# Patient Record
Sex: FEMALE
[De-identification: names, ages, dates, MRNs, and addresses within clinical notes are randomized per-mention and may not be internally consistent; named-entity substitution may affect disease eponyms.]

---

## 2018-07-25 ENCOUNTER — HOSPITAL ENCOUNTER (EMERGENCY)
Dept: HOSPITAL 42 - ED | Age: 23
LOS: 1 days | Discharge: HOME | End: 2018-07-26
Payer: SELF-PAY

## 2018-07-25 VITALS — SYSTOLIC BLOOD PRESSURE: 100 MMHG | DIASTOLIC BLOOD PRESSURE: 52 MMHG

## 2018-07-25 VITALS — BODY MASS INDEX: 23.8 KG/M2

## 2018-07-25 DIAGNOSIS — R10.9: ICD-10-CM

## 2018-07-25 DIAGNOSIS — O26.891: Primary | ICD-10-CM

## 2018-07-25 DIAGNOSIS — Z3A.13: ICD-10-CM

## 2018-07-25 DIAGNOSIS — J02.9: ICD-10-CM

## 2018-07-25 LAB
ALBUMIN SERPL-MCNC: 4.1 G/DL (ref 3–4.8)
ALBUMIN/GLOB SERPL: 1.2 {RATIO} (ref 1.1–1.8)
ALT SERPL-CCNC: 18 U/L (ref 7–56)
APPEARANCE UR: CLEAR
AST SERPL-CCNC: 19 U/L (ref 14–36)
BACTERIA #/AREA URNS HPF: (no result) /[HPF]
BILIRUB UR-MCNC: NEGATIVE MG/DL
BUN SERPL-MCNC: 3 MG/DL (ref 7–21)
CALCIUM SERPL-MCNC: 9.4 MG/DL (ref 8.4–10.5)
COLOR UR: (no result)
ERYTHROCYTE [DISTWIDTH] IN BLOOD BY AUTOMATED COUNT: 13.8 % (ref 11.5–14.5)
GFR NON-AFRICAN AMERICAN: > 60
GLUCOSE UR STRIP-MCNC: NEGATIVE MG/DL
HGB BLD-MCNC: 11.8 G/DL (ref 12–16)
LEUKOCYTE ESTERASE UR-ACNC: NEGATIVE LEU/UL
LIPASE SERPL-CCNC: 31 U/L (ref 23–300)
MCH RBC QN AUTO: 25.7 PG (ref 25–35)
MCHC RBC AUTO-ENTMCNC: 33.7 G/DL (ref 31–37)
MCV RBC AUTO: 76.3 FL (ref 80–105)
PH UR STRIP: 8 [PH] (ref 4.7–8)
PLATELET # BLD: 183 10^3/UL (ref 120–450)
PMV BLD AUTO: 10.3 FL (ref 7–11)
PROT UR STRIP-MCNC: 30 MG/DL
RBC # BLD AUTO: 4.59 10^6/UL (ref 3.5–6.1)
RBC # UR STRIP: NEGATIVE /UL
RBC #/AREA URNS HPF: (no result) /HPF (ref 0–2)
SP GR UR STRIP: 1.02 (ref 1–1.03)
UROBILINOGEN UR STRIP-ACNC: 0.2 E.U./DL
WBC # BLD AUTO: 10.1 10^3/UL (ref 4.5–11)
WBC #/AREA URNS HPF: NEGATIVE /HPF (ref 0–6)

## 2018-07-25 NOTE — ED PDOC
Arrival/HPI





- General


Time Seen by Provider: 18 20:37


Historian: Patient





- History of Present Illness


Narrative History of Present Illness (Text): 


18 20:53


Lauren Villegas is a 22 year old female,  P:1, currently 3 

months pregnant, whose past medical history includes appendectomy and 

nephrolithiasis, who presents to the emergency department complaining of 

abdominal cramping with associated lower back discomfort for the past few days. 

Patient also complaining of sore throat and left ear pain. Patient denies any 

fever, chills, chest pain, shortness of breath, nausea, vomiting, diarrhea, 

urinary symptoms, neck pain, headache, dizziness, or any other complaints.





Symptom Onset: Gradual


Symptom Course: Unchanged


Activities at Onset: Light


Context: Home





Past Medical History





- Provider Review


Nursing Documentation Reviewed: Yes





Family/Social History





- Physician Review


Nursing Documentation Reviewed: Yes


Family/Social History: Unknown Family HX





Allergies/Home Meds


Allergies/Adverse Reactions: 


Allergies





No Known Allergies Allergy (Verified 18 20:47)


 











Review of Systems





- Physician Review


All systems were reviewed & negative as marked: Yes





- Review of Systems


Constitutional: Normal.  absent: Fevers


Eyes: Normal


ENT: Sore Throat, Other (+left ear pain)


Respiratory: Normal.  absent: SOB, Cough, Wheezing


Cardiovascular: Normal.  absent: Chest Pain


Gastrointestinal: Abdominal Pain (+suprapubic).  absent: Diarrhea, Nausea, 

Vomiting


Genitourinary Female: Normal.  absent: Dysuria, Frequency, Hematuria, Urine 

Output Changes


Musculoskeletal: Back Pain.  absent: Neck Pain


Skin: Normal.  absent: Rash


Neurological: Normal.  absent: Headache, Dizziness


Endocrine: Normal


Hemo/Lymphatic: Normal


Psychiatric: Normal





Physical Exam


Vital Signs Reviewed: Yes


Vital Signs











  Temp Pulse Resp BP Pulse Ox


 


 18 22:31  99.7 F H  128 H  20  100/52 L  100


 


 18 20:30  98 F  130 H  18  114/58 L  99











Temperature: Afebrile


Blood Pressure: Normal


Pulse: Regular


Respiratory Rate: Normal


Appearance: Positive for: Well-Appearing, Non-Toxic, Comfortable


Pain Distress: None


Mental Status: Positive for: Alert and Oriented X 3





- Systems Exam


Head: Present: Atraumatic, Normocephalic


Pupils: Present: PERRL


Extroacular Muscles: Present: EOMI


Conjunctiva: Present: Normal


Ears: Present: Normal, NORMAL TM, Normal Canal.  No: Erythema, TM Bulging, Fluid

, TM Perf


Mouth: Present: Moist Mucous Membranes


Pharnyx: Present: ERYTHEMA (Erythema to posterior pharynx).  No: EXUDATE, 

TONSILS ENLARGED, Peritonsilar Swelling, Uvular Deviation, Muffled/Hoarse Voice

, Strider, Soft Palate/Uvular Edema


Nose (External): Present: Atraumatic


Nose (Internal): Present: Normal Inspection


Neck: Present: Normal Range of Motion


Respiratory/Chest: Present: Clear to Auscultation, Good Air Exchange.  No: 

Respiratory Distress, Accessory Muscle Use


Cardiovascular: Present: Regular Rate and Rhythm, Normal S1, S2.  No: Murmurs


Abdomen: No: Tenderness, Distention, Peritoneal Signs


Back: Present: Normal Inspection


Upper Extremity: Present: Normal Inspection.  No: Cyanosis, Edema


Lower Extremity: Present: Normal Inspection.  No: Edema


Neurological: Present: GCS=15, CN II-XII Intact, Speech Normal


Skin: Present: Warm, Dry, Normal Color.  No: Rashes


Psychiatric: Present: Alert, Oriented x 3, Normal Insight, Normal Concentration





Medical Decision Making


ED Course and Treatment: 


18 20:53


Impression:


22 year old female complaining of abdominal cramping, lower back discomfort, 

sore throat, and left ear pain.





Plan:


-- US Fetal Age


-- Labs, Beta-HCG, lipase


-- UA


-- Reassess and disposition





Progress Notes:


18 22:57


US Fetal Age shows:


Fetus: There is a single living intrauterine pregnancy.


Gestational age: Measurements correlate with a mean gestational age of 13 weeks 

4 days. The


estimated date of delivery 2019.


Heart rate: Embryonic/fetal cardiac activity is identified, at a rate of 172 

beats per minute.


Placenta: The placenta is located posteriorly and is normal.


The cervix is closed measuring 4 cm.


IMPRESSION:


Live intrauterine pregnancy, with no evidence of early pregnancy complications.





18 00:05


On re-evaluation, pt feels better, in no acute distress. I have discussed the 

plan with the patient, who expresses understanding. Patient in agreement with 

the plan to be discharged home. Patient is stable for discharge. Patient was 

instructed to follow up with physician or return if symptoms persist/worsen or 

new concerning symptoms arise.








- Lab Interpretations


Lab Results: 








 18 21:30 





 18 21:30 





 Lab Results





18 21:30: WBC 10.1, RBC 4.59, Hgb 11.8 L, Hct 35.0 L, MCV 76.3 L, MCH 25.7

, MCHC 33.7, RDW 13.8, Plt Count 183, MPV 10.3


18 21:30: Beta HCG, Quant 427269.00 H


18 21:30: Sodium 136, Potassium 4.1, Chloride 103, Carbon Dioxide 21, 

Anion Gap 16, BUN 3 L, Creatinine 0.5 L, Est GFR ( Amer) > 60, Est GFR (

Non-Af Amer) > 60, Random Glucose 95, Calcium 9.4, Total Bilirubin 0.3, AST 19, 

ALT 18, Alkaline Phosphatase 59, Total Protein 7.6, Albumin 4.1, Globulin 3.5, 

Albumin/Globulin Ratio 1.2, Lipase 31


18 21:30: Urine Color Dark yellow, Urine Appearance Clear, Urine pH 8.0, 

Ur Specific Gravity 1.020, Urine Protein 30 H, Urine Glucose (UA) Negative, 

Urine Ketones Negative, Urine Blood Negative, Urine Nitrate Negative, Urine 

Bilirubin Negative, Urine Urobilinogen 0.2, Ur Leukocyte Esterase Negative, 

Urine RBC 0 - 2, Urine WBC Negative, Ur Epithelial Cells 4 - 5, Urine Bacteria 

Small








I have reviewed the lab results: Yes





- RAD Interpretation


Radiology Orders: 








18 20:55


FETAL AGE [US] Stat 











: Radiologist





- Medication Orders


Current Medication Orders: 











Discontinued Medications





Acetaminophen (Tylenol 325mg Tab)  650 mg PO STAT STA


   Stop: 18 22:40


   Last Admin: 18 22:48  Dose: 650 mg





MAR Pain/Vitals


 Document     18 22:48  LA  (Rec: 18 22:48  LA  [id number])


     Pain Reassessment


      Is This A Pain ReAssessment?               No


     Sleep


      Is patient sleeping during reassessment?   No


     Presence of Pain


      Presence of Pain                           Yes


     Pain Scale Used


      Pain Scale Used                            Numeric


     Location


      Pain Location Body Site                    Back


      Intensity                                  5


      Scale Used                                 Numeric





Amoxicillin (Amoxil 500 Mg Cap)  500 mg PO STAT STA


   PRN Reason: Protocol


   Stop: 18 23:12


   Last Admin: 18 00:25  Dose: 500 mg











- Carlos A Statement


The provider has reviewed the documentation as recorded by the Carlos A Medina





Provider Carlos A Attestation:


All medical record entries made by the Brandtiblew were at my direction and 

personally dictated by me. I have reviewed the chart and agree that the record 

accurately reflects my personal performance of the history, physical exam, 

medical decision making, and the department course for this patient. I have 

also personally directed, reviewed, and agree with the discharge instructions 

and disposition.








Disposition/Present on Arrival





- Present on Arrival


Any Indicators Present on Arrival: No





- Disposition


Have Diagnosis and Disposition been Completed?: Yes


Diagnosis: 


 Pharyngitis, Abdominal pain in pregnancy





Disposition: HOME/ ROUTINE


Disposition Time: 00:05


Patient Plan: Discharge


Patient Problems: 


 Current Active Problems











Problem Status Onset


 


Abdominal pain in pregnancy Acute  


 


Pharyngitis Acute  











Condition: STABLE


Discharge Instructions (ExitCare):  Low Back Pain  (DC), Sore Throat, Adult (DC)

, Acute Abdomen (Belly Pain), Adult (DC)


Additional Instructions: 


Take meds as prescribed/Drink plenty of liquids/follow up with your 

gynecologist this week


Prescriptions: 


Amoxicillin [Amoxil 500 mg Cap] 500 mg PO TID #21 cap

## 2018-07-26 VITALS — OXYGEN SATURATION: 98 % | HEART RATE: 88 BPM | RESPIRATION RATE: 18 BRPM | TEMPERATURE: 98.9 F

## 2018-07-26 NOTE — US
Date of service: 



07/25/2018



PROCEDURE:  Obstetrical ultrasound



HISTORY:

abdominal cramps



COMPARISON:

None



TECHNIQUE:

Transabdominal



FINDINGS:

There is a single live intrauterine gestation.  The fetal heart rate 

is 172 beats per minute. A grossly normal quantity of amniotic fluid 

is visualized. The fetus is in variable presentation. A posterior 

placenta is identified.  There is no evidence of placenta previa. The 

cervix is long and closed and measures 3.9 cm.



Fetal biometry demonstrates a gestational age of 13 weeks 4 days. The 

LUIS MIGUEL by ultrasound is 1/26/2019.



Fetal anatomy could not be adequately assessed due to the early stage 

of gestation.



IMPRESSION:

Single live intrauterine gestation of approximately 13 weeks 4 days.  

Fetal heart rate 172. Variable presentation.  Posterior placenta. No 

previa.  Cervix long and closed.



The preliminary findings for this examination were reported by 

[facility] at 10:53 p.m. on 7/25/2018.. There is concurrence 

of this report with the preliminary findings.